# Patient Record
Sex: FEMALE | Race: OTHER | HISPANIC OR LATINO | URBAN - METROPOLITAN AREA
[De-identification: names, ages, dates, MRNs, and addresses within clinical notes are randomized per-mention and may not be internally consistent; named-entity substitution may affect disease eponyms.]

---

## 2018-06-18 ENCOUNTER — EMERGENCY (EMERGENCY)
Facility: HOSPITAL | Age: 63
LOS: 1 days | Discharge: HOME | End: 2018-06-18
Attending: EMERGENCY MEDICINE | Admitting: EMERGENCY MEDICINE
Payer: MEDICAID

## 2018-06-18 VITALS
OXYGEN SATURATION: 100 % | DIASTOLIC BLOOD PRESSURE: 107 MMHG | SYSTOLIC BLOOD PRESSURE: 183 MMHG | HEART RATE: 117 BPM | TEMPERATURE: 99 F | RESPIRATION RATE: 18 BRPM

## 2018-06-18 LAB
ALBUMIN SERPL ELPH-MCNC: 4.4 G/DL — SIGNIFICANT CHANGE UP (ref 3.3–5)
ALP SERPL-CCNC: 109 U/L — SIGNIFICANT CHANGE UP (ref 40–120)
ALT FLD-CCNC: 29 U/L — SIGNIFICANT CHANGE UP (ref 4–33)
AST SERPL-CCNC: 23 U/L — SIGNIFICANT CHANGE UP (ref 4–32)
BASE EXCESS BLDV CALC-SCNC: 3 MMOL/L — SIGNIFICANT CHANGE UP
BASOPHILS # BLD AUTO: 0.05 K/UL — SIGNIFICANT CHANGE UP (ref 0–0.2)
BASOPHILS NFR BLD AUTO: 1 % — SIGNIFICANT CHANGE UP (ref 0–2)
BILIRUB SERPL-MCNC: 0.4 MG/DL — SIGNIFICANT CHANGE UP (ref 0.2–1.2)
BLOOD GAS VENOUS - CREATININE: 0.78 MG/DL — SIGNIFICANT CHANGE UP (ref 0.5–1.3)
BUN SERPL-MCNC: 9 MG/DL — SIGNIFICANT CHANGE UP (ref 7–23)
CALCIUM SERPL-MCNC: 9.5 MG/DL — SIGNIFICANT CHANGE UP (ref 8.4–10.5)
CHLORIDE BLDV-SCNC: 109 MMOL/L — HIGH (ref 96–108)
CHLORIDE SERPL-SCNC: 102 MMOL/L — SIGNIFICANT CHANGE UP (ref 98–107)
CO2 SERPL-SCNC: 25 MMOL/L — SIGNIFICANT CHANGE UP (ref 22–31)
CREAT SERPL-MCNC: 0.83 MG/DL — SIGNIFICANT CHANGE UP (ref 0.5–1.3)
EOSINOPHIL # BLD AUTO: 0.05 K/UL — SIGNIFICANT CHANGE UP (ref 0–0.5)
EOSINOPHIL NFR BLD AUTO: 1 % — SIGNIFICANT CHANGE UP (ref 0–6)
GAS PNL BLDV: 138 MMOL/L — SIGNIFICANT CHANGE UP (ref 136–146)
GLUCOSE BLDV-MCNC: 95 — SIGNIFICANT CHANGE UP (ref 70–99)
GLUCOSE SERPL-MCNC: 94 MG/DL — SIGNIFICANT CHANGE UP (ref 70–99)
HCO3 BLDV-SCNC: 25 MMOL/L — SIGNIFICANT CHANGE UP (ref 20–27)
HCT VFR BLD CALC: 39 % — SIGNIFICANT CHANGE UP (ref 34.5–45)
HCT VFR BLDV CALC: 43.8 % — SIGNIFICANT CHANGE UP (ref 34.5–45)
HGB BLD-MCNC: 13.6 G/DL — SIGNIFICANT CHANGE UP (ref 11.5–15.5)
HGB BLDV-MCNC: 14.3 G/DL — SIGNIFICANT CHANGE UP (ref 11.5–15.5)
IMM GRANULOCYTES # BLD AUTO: 0 # — SIGNIFICANT CHANGE UP
IMM GRANULOCYTES NFR BLD AUTO: 0 % — SIGNIFICANT CHANGE UP (ref 0–1.5)
LACTATE BLDV-MCNC: 1.3 MMOL/L — SIGNIFICANT CHANGE UP (ref 0.5–2)
LYMPHOCYTES # BLD AUTO: 2.07 K/UL — SIGNIFICANT CHANGE UP (ref 1–3.3)
LYMPHOCYTES # BLD AUTO: 41 % — SIGNIFICANT CHANGE UP (ref 13–44)
MCHC RBC-ENTMCNC: 29.1 PG — SIGNIFICANT CHANGE UP (ref 27–34)
MCHC RBC-ENTMCNC: 34.9 % — SIGNIFICANT CHANGE UP (ref 32–36)
MCV RBC AUTO: 83.5 FL — SIGNIFICANT CHANGE UP (ref 80–100)
MONOCYTES # BLD AUTO: 0.46 K/UL — SIGNIFICANT CHANGE UP (ref 0–0.9)
MONOCYTES NFR BLD AUTO: 9.1 % — SIGNIFICANT CHANGE UP (ref 2–14)
NEUTROPHILS # BLD AUTO: 2.42 K/UL — SIGNIFICANT CHANGE UP (ref 1.8–7.4)
NEUTROPHILS NFR BLD AUTO: 47.9 % — SIGNIFICANT CHANGE UP (ref 43–77)
NRBC # FLD: 0 — SIGNIFICANT CHANGE UP
PCO2 BLDV: 50 MMHG — SIGNIFICANT CHANGE UP (ref 41–51)
PH BLDV: 7.37 PH — SIGNIFICANT CHANGE UP (ref 7.32–7.43)
PLATELET # BLD AUTO: 224 K/UL — SIGNIFICANT CHANGE UP (ref 150–400)
PMV BLD: 10.7 FL — SIGNIFICANT CHANGE UP (ref 7–13)
PO2 BLDV: 28 MMHG — LOW (ref 35–40)
POTASSIUM BLDV-SCNC: 3.7 MMOL/L — SIGNIFICANT CHANGE UP (ref 3.4–4.5)
POTASSIUM SERPL-MCNC: 3.9 MMOL/L — SIGNIFICANT CHANGE UP (ref 3.5–5.3)
POTASSIUM SERPL-SCNC: 3.9 MMOL/L — SIGNIFICANT CHANGE UP (ref 3.5–5.3)
PROT SERPL-MCNC: 8.6 G/DL — HIGH (ref 6–8.3)
RBC # BLD: 4.67 M/UL — SIGNIFICANT CHANGE UP (ref 3.8–5.2)
RBC # FLD: 11.8 % — SIGNIFICANT CHANGE UP (ref 10.3–14.5)
SAO2 % BLDV: 44.1 % — LOW (ref 60–85)
SODIUM SERPL-SCNC: 141 MMOL/L — SIGNIFICANT CHANGE UP (ref 135–145)
TROPONIN T, HIGH SENSITIVITY RESULT: < 6 NG/L — SIGNIFICANT CHANGE UP (ref ?–14)
TROPONIN T, HIGH SENSITIVITY RESULT: < 6 NG/L — SIGNIFICANT CHANGE UP (ref ?–14)
WBC # BLD: 5.05 K/UL — SIGNIFICANT CHANGE UP (ref 3.8–10.5)
WBC # FLD AUTO: 5.05 K/UL — SIGNIFICANT CHANGE UP (ref 3.8–10.5)

## 2018-06-18 PROCEDURE — 99218: CPT

## 2018-06-18 PROCEDURE — 71046 X-RAY EXAM CHEST 2 VIEWS: CPT | Mod: 26

## 2018-06-18 RX ORDER — FAMOTIDINE 10 MG/ML
20 INJECTION INTRAVENOUS AT BEDTIME
Qty: 0 | Refills: 0 | Status: DISCONTINUED | OUTPATIENT
Start: 2018-06-18 | End: 2018-06-22

## 2018-06-18 RX ORDER — METOPROLOL TARTRATE 50 MG
50 TABLET ORAL
Qty: 0 | Refills: 0 | Status: DISCONTINUED | OUTPATIENT
Start: 2018-06-18 | End: 2018-06-22

## 2018-06-18 RX ORDER — PANTOPRAZOLE SODIUM 20 MG/1
40 TABLET, DELAYED RELEASE ORAL DAILY
Qty: 0 | Refills: 0 | Status: DISCONTINUED | OUTPATIENT
Start: 2018-06-18 | End: 2018-06-22

## 2018-06-18 RX ORDER — KETOROLAC TROMETHAMINE 30 MG/ML
15 SYRINGE (ML) INJECTION ONCE
Qty: 0 | Refills: 0 | Status: DISCONTINUED | OUTPATIENT
Start: 2018-06-18 | End: 2018-06-18

## 2018-06-18 RX ORDER — DIPHENHYDRAMINE HCL 50 MG
25 CAPSULE ORAL AT BEDTIME
Qty: 0 | Refills: 0 | Status: DISCONTINUED | OUTPATIENT
Start: 2018-06-18 | End: 2018-06-22

## 2018-06-18 RX ADMIN — Medication 50 MILLIGRAM(S): at 21:14

## 2018-06-18 RX ADMIN — Medication 300 MILLIGRAM(S): at 21:16

## 2018-06-18 RX ADMIN — FAMOTIDINE 20 MILLIGRAM(S): 10 INJECTION INTRAVENOUS at 21:14

## 2018-06-18 RX ADMIN — Medication 25 MILLIGRAM(S): at 21:14

## 2018-06-18 RX ADMIN — Medication 15 MILLIGRAM(S): at 21:00

## 2018-06-18 RX ADMIN — Medication 15 MILLIGRAM(S): at 20:34

## 2018-06-18 RX ADMIN — Medication 0.2 MILLIGRAM(S): at 21:14

## 2018-06-18 NOTE — ED ADULT TRIAGE NOTE - CHIEF COMPLAINT QUOTE
pt presents c/o infected tooth x months now c/o neck swelling and pain radiating to left chest with fever since last week worsening. pt reports she is unable to find an oral surgeon to pull tooth and she came to ED due to her history of endocarditis. pt reports taking bp meds today and states her bp keeps increasing PMHX: endocarditis, htn, sjogren' s disease, osteoporosis

## 2018-06-18 NOTE — ED PROVIDER NOTE - NS ED ROS FT
GENERAL: No fever or chills, EYES: no change in vision, HEENT: no trouble swallowing or speaking, CARDIAC: no chest pain, PULMONARY: no cough or SOB, GI: no abdominal pain, no nausea, no vomiting, no diarrhea or constipation, : No changes in urination, SKIN: no rashes, NEURO: no headache,  MSK: No joint pain ~Kaiden Lora M.D. Resident

## 2018-06-18 NOTE — ED PROVIDER NOTE - MEDICAL DECISION MAKING DETAILS
61 yo F PMHx Sjogren's, endocarditis, HTN, CAD with stent x 1 p/w toothache and L-sided CP, DDx: dental abscess, CAD will obtain basic labs + troponin, blood cultures, EKG, dental consult, reevaluate

## 2018-06-18 NOTE — ED PROVIDER NOTE - OBJECTIVE STATEMENT
61 yo F PMHx Sjogren's, endocarditis, HTN, CAD with stent x 1 p/w toothache and L-sided CP. Pt has had an infected tooth for months and has been trying to find an oral surgeon for tooth extraction but given her complicated history of endocarditis she has not been able to find a willing provider. She has had pain around the tooth for months but in the past 2 days she has also had L sided chest pain and L arm pain. She has been taking tylenol with minimal relief. She denies abd pain, N/V, has had low grade fevers.

## 2018-06-18 NOTE — PROGRESS NOTE ADULT - SUBJECTIVE AND OBJECTIVE BOX
Patient is a 62y old female who presents with a chief complaint of lower left tooth pain    HPI: pt reports that crown on lower left first premolar dislodged many years ago, pt did not obtain treatment. Tooth began having mild pain a few months ago, however last week patient began experiencing severe pain LL (10/10) radiating to head and down her neck. Pt reports that pain interferes with sleep, pt cannot eat on left side. Pt went to outside dentist who declined to extract tooth due to pt's medical hx of Sjogren's and HTN. Pt already was prescribed one-week course of Z-Sylvester antibiotics by outside dentist. Pt would like tooth extracted as soon as possible.     PAST MEDICAL & SURGICAL HISTORY:   hx of endocarditis   cardiac stent  Sjogren's Syndrome   HTN                        MEDICATIONS  (STANDING):    MEDICATIONS  (PRN):      Allergies:   Avelox (Unknown)  Cipro (Unknown)  vanco, pcn, rocephin pt. not sure? (Unknown)    *SOCIAL HISTORY: pt presented with     *Last Dental Visit: two weeks ago    Vital Signs Last 24 Hrs  T(C): 37.1 (18 Jun 2018 16:48), Max: 37.2 (18 Jun 2018 15:07)  T(F): 98.8 (18 Jun 2018 16:48), Max: 99 (18 Jun 2018 15:07)  HR: 94 (18 Jun 2018 16:48) (92 - 117)  BP: 156/80 (18 Jun 2018 16:48) (156/80 - 183/107)  BP(mean): --  RR: 17 (18 Jun 2018 16:48) (16 - 18)  SpO2: 98% (18 Jun 2018 16:48) (98% - 100%)    EOE:  TMJ   ( -  ) clicks                    ( -   ) pops                    (  -  ) crepitus             ( -  ) trismus             ( -  ) LAD             ( -  ) swelling             ( -  ) asymmetry             ( -  ) palpation              IOE:  permanent dentition: partially intact with multiple missing teeth           hard/soft palate:  ( -  ) palatal torus           tongue/FOM: WNL           labial/buccal mucosa: WNL           no clinical signs of acute infection  #21: carious retained root, missing crown restoration           ( +  ) percussion           ( +  ) palpation           ( -  ) swelling           ( + ) caries           ( - ) mobility     *DENTAL RADIOGRAPHS: periapical taken #21 previous endodontic therapy, recurrent caries. Periapical radiolucency noted.    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     PROCEDURE:  EOE, IOE, rads. Explained to pt that there are no clinical signs of acute infection, and that tooth should be extracted in outpatient setting. Pt understands and accepts. Gave pt contact information for [facility] to schedule emergency appointment.     RECOMMENDATIONS:  1) Pain control medication as per ED physician instructions.   2) Dental F/U with outpatient dentist for extraction #21 and comprehensive dental care.   3) If any difficulty swallowing/breathing, facial swelling, or fever occur, return to ED.     Sundeep Espino DDS #[id number]

## 2018-06-18 NOTE — ED CDU PROVIDER INITIAL DAY NOTE - ATTENDING CONTRIBUTION TO CARE
I performed a face-to-face evaluation of the patient and performed a history and physical examination. I agree with the history and physical examination.    Jacob: Tooth 21 degenerated, 6 months of pain, affecting L jaw and neck. L shoulder pain and L CP not related to exertion. Recent infection of tooth 21 improved w/ Z-pack (less fever/pain/swelling). Unable to get extraction as outpatient b/c h/o cath-related endocarditis and her HTN. Has seen multiple dentists and failed to get extracted. Has known 2/6 DANILO at [facility] (old). In ED, serum WBC unremarkable; no F. Will admit to CDU for serial trop and EKG. In AM, call Dental Clinic [phone] for same-day appointment. Control BP. Take Clindamycin 300 mg 1 hour before appointment. If fails to get appointment or extraction, admit patient or have patient return to [facility] June 19 to see Star James in [place] starting at 4 PM.

## 2018-06-18 NOTE — ED ADULT NURSE NOTE - OBJECTIVE STATEMENT
Pt received in #6, aaox3 with c/o ongoing left lower dental pain. States that she has an infected tooth which requires extraction. However, the pt has been unable to make an appointment with an oral surgeon. Pt states that her tooth pain is chronic. Denies any acute symptoms or deviations from baseline.

## 2018-06-18 NOTE — ED PROVIDER NOTE - PHYSICAL EXAMINATION
Gen: AAOx3, non-toxic  Head: NCAT  HEENT: EOMI, oral mucosa moist, normal conjunctiva, bleeding and rotten appearing tooth #21, pain to palpation of surrounding gums, no neck swelling, no trismus  Lung: CTAB, no respiratory distress, no wheezes/rhonchi/rales B/L, speaking in full sentences  CV: RRR, no murmurs, rubs or gallops  Abd: soft, NTND, no guarding  MSK: no visible deformities  Neuro: No focal sensory or motor deficits  Skin: Warm, well perfused, no rash  Psych: normal affect.   ~Kaiden Lora M.D. Resident

## 2018-06-18 NOTE — ED PROVIDER NOTE - ATTENDING CONTRIBUTION TO CARE
62F pmh sjogren's syndrome, endocarditis (s/p cath 2 yrs ago), cad s/p stent x 1, htn, presents with dental pain x mo, now with L-sided neck and chest pain over the past couple of days. pain sharp, constant, no exacerbating or alleviating sx. pain does radiate to L arm. does not radiate to back. denies sob. denies chills, n/v/d, abd pain. states she has had "low grade fever at home." has been taking extra strength tylenol with only min alleviation of pain. denies headache, dizziness, change in vision, numbness, weakness.     PE: NAD, NCAT, MMM, Trachea midline, Normal conjunctiva, lungs CTAB, S1/S2 RRR, Normal perfusion, 2+ radial pulses bilat, Abdomen Soft, NTND, No rebound/guarding, No LE edema, No deformity of extremities, No rashes,  No focal motor or sensory deficits.     62F pmh cad s/p stent with subsequent endocarditis, sjogren's syndrome, htn, presents with dental/jaw pain, along with L-sided chest pain radiating to neck and L arm. pain does not radiate to back, pulses equal, low suspicion of dissection. denies sob, no hypoxia, no calf swelling or pain, very low suspicion of PE. concern for potential acs, to obtain troponin. ekg without ischemic changes. pt is afebrile in ed per rectal temp, low suspicion of endocarditis but given hx and dental complaint will check blood culture x 3. Check CBC eval for anemia, cmp eval for metabolic derangement. cxr eval for effusion/consolidation. if no sig abnormalities and trop not abnormal, plan for cdu for stress as heart score 4. - Kilo Gonsalves MD 62F pmh sjogren's syndrome, endocarditis (s/p cath 2 yrs ago), cad s/p stent x 1, htn, presents with dental pain x mo, now with L-sided neck and chest pain over the past couple of days. pain sharp, constant, no exacerbating or alleviating sx. pain does radiate to L arm. does not radiate to back. denies sob. denies chills, n/v/d, abd pain. states she has had "low grade fever at home." has been taking extra strength tylenol with only min alleviation of pain. denies headache, dizziness, change in vision, numbness, weakness.     PE: NAD, NCAT, MMM, Trachea midline, Normal conjunctiva, lungs CTAB, S1/S2 RRR, Normal perfusion, 2+ radial pulses bilat, Abdomen Soft, NTND, No rebound/guarding, No LE edema, No deformity of extremities, No rashes,  No focal motor or sensory deficits.     62F pmh cad s/p stent with subsequent endocarditis, sjogren's syndrome, htn, presents with dental/jaw pain, along with L-sided chest pain radiating to neck and L arm. pain does not radiate to back, pulses equal, low suspicion of dissection. denies sob, no hypoxia, no calf swelling or pain, very low suspicion of PE. concern for potential acs, to obtain troponin. ekg without ischemic changes. pt is afebrile in ed per rectal temp, low suspicion of endocarditis but given hx and dental complaint will check blood culture x 3. Check CBC eval for anemia, cmp eval for metabolic derangement. cxr eval for effusion/consolidation. if no sig abnormalities and trop not abnormal, plan for cdu for stress vs delta trop as heart score 4. - Kilo Gonsalves MD

## 2018-06-18 NOTE — ED CDU PROVIDER INITIAL DAY NOTE - MEDICAL DECISION MAKING DETAILS
Jacob: Tooth 21 degenerated, 6 months of pain, affecting L jaw and neck. L shoulder pain and L CP not related to exertion. Recent infection of tooth 21 improved w/ Z-pack (less fever/pain/swelling). Unable to get extraction as outpatient b/c h/o cath-related endocarditis and her HTN. Has seen multiple dentists and failed to get extracted. Has known 2/6 DANILO at [email or web address] (old). In ED, serum WBC unremarkable; no F. Will admit to CDU for serial trop and EKG. In AM, call Dental Clinic [phone] for same-day appointment. Control BP. Take Clindamycin 300 mg 1 hour before appointment. If fails to get appointment or extraction, admit patient or have patient return to [facility] June 19 to see Star James in [place] starting at 4 PM.

## 2018-06-18 NOTE — ED CDU PROVIDER INITIAL DAY NOTE - OBJECTIVE STATEMENT
Jaocb: Tooth 21 degenerated, 6 months of pain, affecting L jaw and neck. L shoulder pain and L CP not related to exertion. Recent infection of tooth 21 improved w/ Z-pack (less fever/pain/swelling). Unable to get extraction as outpatient b/c h/o cath-related endocarditis and her HTN. Has seen multiple dentists and failed to get extracted. Has known 2/6 DANILO at [facility] (old). In ED, serum WBC unremarkable; no F. Will admit to CDU for serial trop and EKG. In AM, call Dental Clinic [phone] for same-day appointment. Control BP. Take Clindamycin 300 mg 1 hour before appointment. If fails to get appointment or extraction, admit patient or have patient return to [facility] June 19 to see Star James in [place] starting at 4 PM.

## 2018-06-19 VITALS
OXYGEN SATURATION: 98 % | DIASTOLIC BLOOD PRESSURE: 85 MMHG | TEMPERATURE: 98 F | SYSTOLIC BLOOD PRESSURE: 130 MMHG | HEART RATE: 72 BPM | RESPIRATION RATE: 16 BRPM

## 2018-06-19 LAB
SPECIMEN SOURCE: SIGNIFICANT CHANGE UP

## 2018-06-19 PROCEDURE — 99217: CPT

## 2018-06-19 RX ORDER — KETOROLAC TROMETHAMINE 30 MG/ML
15 SYRINGE (ML) INJECTION ONCE
Qty: 0 | Refills: 0 | Status: DISCONTINUED | OUTPATIENT
Start: 2018-06-19 | End: 2018-06-19

## 2018-06-19 RX ADMIN — Medication 50 MILLIGRAM(S): at 07:07

## 2018-06-19 RX ADMIN — Medication 15 MILLIGRAM(S): at 01:26

## 2018-06-19 RX ADMIN — Medication 15 MILLIGRAM(S): at 01:41

## 2018-06-19 RX ADMIN — Medication 300 MILLIGRAM(S): at 07:07

## 2018-06-19 RX ADMIN — Medication 0.2 MILLIGRAM(S): at 07:07

## 2018-06-19 NOTE — ED CDU PROVIDER DISPOSITION NOTE - PLAN OF CARE
You were seen for toothpain. You were given Clindamycin for a dental infection. Your blood pressure was normal while in the hospital. Follow up with the dental clinic today, if they cannot remove your tooth you should return to the ER for admission. Please follow up with your Primary MD in 24-48 hr.  Seek immediate medical care for any new/worsening signs or symptoms.

## 2018-06-19 NOTE — ED CDU PROVIDER SUBSEQUENT DAY NOTE - HISTORY
61 yo F here for tooth pain, sent from dentist for HTN and vaugue CP. CP now resolved and pt reports + tooth pain. In CDU for monitoring, abx, and BP monitoring. Will dc in AM for dental clinic for extraction.

## 2018-06-19 NOTE — ED CDU PROVIDER SUBSEQUENT DAY NOTE - PROGRESS NOTE DETAILS
pt doing well, pain improved with toradol. will continue to monitor overnight. home BP meds ordered. will continue to monitor and d/c to dental clinic in AM. Patient laying comfortably in bed NAD, No complaints. No toothache. /85. Pt can be discharge and f/u in dental clinic this morning for tooth extraction. The patient was given verbal and written discharge instructions. Specifically, instructions when to return to the ED and when to seek follow-up from their pcp was discussed. Any specialty follow-up was discussed, including how to make an appointment.  Instructions were discussed in simple, plain language and was understood by the patient. The patient understands that should their symptoms worsen or any new symptoms arise, they should return to the ED immediately for further evaluation. All pt's questions were answered. Patient verbalizes understanding.

## 2018-06-19 NOTE — ED CDU PROVIDER DISPOSITION NOTE - CLINICAL COURSE
Nahun PARADA- 61 Y/O F with h/o endocarditis, htn p/w tooth pain and was sent to ed for elevated bp and plan to monitor chace overnight and if it remains controlled , then discharge to f/u with dental clinic in the morning for dental extraction    bp remained very stable and controlled, discharge to dental clinic for extraction today. stable for discharge

## 2018-06-19 NOTE — ED CDU PROVIDER SUBSEQUENT DAY NOTE - ENMT NEGATIVE STATEMENT, MLM
Ears: no ear pain and no hearing problems.Nose: no nasal congestion and no nasal drainage.Mouth/Throat: +dental pain, no dysphagia, no hoarseness and no throat pain.Neck: no lumps, no pain, no stiffness and no swollen glands

## 2018-06-19 NOTE — ED CDU PROVIDER SUBSEQUENT DAY NOTE - MEDICAL DECISION MAKING DETAILS
61 yo F here for toothache/dental pain. plan for obs in CDU overnight, monitor BP, and in AM dc to dental clinic for same day appt. will give clinda in CDU. if unable to extract in clinic tomorrow pt is to return to ED for admission for extraction.

## 2018-06-23 LAB
BACTERIA BLD CULT: SIGNIFICANT CHANGE UP

## 2018-11-09 NOTE — ED PROVIDER NOTE - NS_HEARTSCTROPONIN_ED_A_ED
OVERDUE RESULTS REMINDER  Per patient's chart, mammogram is scheduled for 12/20/18    MAMMO SCREENING BILATERAL W CAD Less than or equal to Normal Limit

## 2019-11-26 ENCOUNTER — EMERGENCY (EMERGENCY)
Facility: HOSPITAL | Age: 64
LOS: 1 days | Discharge: HOME | End: 2019-11-26
Attending: STUDENT IN AN ORGANIZED HEALTH CARE EDUCATION/TRAINING PROGRAM | Admitting: STUDENT IN AN ORGANIZED HEALTH CARE EDUCATION/TRAINING PROGRAM
Payer: MEDICAID

## 2019-11-26 VITALS
DIASTOLIC BLOOD PRESSURE: 97 MMHG | TEMPERATURE: 98 F | OXYGEN SATURATION: 100 % | RESPIRATION RATE: 16 BRPM | HEART RATE: 82 BPM | SYSTOLIC BLOOD PRESSURE: 161 MMHG

## 2019-11-26 PROBLEM — I38 ENDOCARDITIS, VALVE UNSPECIFIED: Chronic | Status: ACTIVE | Noted: 2018-06-18

## 2019-11-26 PROBLEM — M35.00 SJOGREN SYNDROME, UNSPECIFIED: Chronic | Status: ACTIVE | Noted: 2018-06-18

## 2019-11-26 PROCEDURE — 99283 EMERGENCY DEPT VISIT LOW MDM: CPT | Mod: 25

## 2019-11-26 PROCEDURE — 16020 DRESS/DEBRID P-THICK BURN S: CPT | Mod: RT

## 2019-11-26 RX ORDER — TETANUS TOXOID, REDUCED DIPHTHERIA TOXOID AND ACELLULAR PERTUSSIS VACCINE, ADSORBED 5; 2.5; 8; 8; 2.5 [IU]/.5ML; [IU]/.5ML; UG/.5ML; UG/.5ML; UG/.5ML
0.5 SUSPENSION INTRAMUSCULAR ONCE
Refills: 0 | Status: COMPLETED | OUTPATIENT
Start: 2019-11-26 | End: 2019-11-26

## 2019-11-26 RX ORDER — ACETAMINOPHEN 500 MG
975 TABLET ORAL ONCE
Refills: 0 | Status: COMPLETED | OUTPATIENT
Start: 2019-11-26 | End: 2019-11-26

## 2019-11-26 RX ORDER — IBUPROFEN 200 MG
600 TABLET ORAL ONCE
Refills: 0 | Status: COMPLETED | OUTPATIENT
Start: 2019-11-26 | End: 2019-11-26

## 2019-11-26 RX ADMIN — Medication 975 MILLIGRAM(S): at 12:48

## 2019-11-26 RX ADMIN — Medication 600 MILLIGRAM(S): at 12:48

## 2019-11-26 RX ADMIN — TETANUS TOXOID, REDUCED DIPHTHERIA TOXOID AND ACELLULAR PERTUSSIS VACCINE, ADSORBED 0.5 MILLILITER(S): 5; 2.5; 8; 8; 2.5 SUSPENSION INTRAMUSCULAR at 12:51

## 2019-11-26 NOTE — ED PROVIDER NOTE - NSFOLLOWUPINSTRUCTIONS_ED_ALL_ED_FT
Burn    A burn is an injury to your skin or the tissues under your skin usually caused by heat or caustic chemicals. In severe cases, a burn can damage the muscles and bones under the skin. There are three different degrees of burns: first (mild), second, and third (severe). Make sure to use any prescribed ointments as directed. If you were prescribed antibiotic medicine, take it as told by your health care provider. Do not stop using the antibiotic even if your condition improves. Follow up is available at the burn clinic.    SEEK IMMEDIATE MEDICAL CARE IF YOU HAVE ANY OF THE FOLLOWING SYMPTOMS: red streaks near the burn, severe pain, or fever. Burn    A burn is an injury to your skin or the tissues under your skin usually caused by heat or caustic chemicals. In severe cases, a burn can damage the muscles and bones under the skin. There are three different degrees of burns: first (mild), second, and third (severe). Make sure to use any prescribed ointments as directed. If you were prescribed antibiotic medicine, take it as told by your health care provider. Do not stop using the antibiotic even if your condition improves. Follow up is available at the burn clinic.    SEEK IMMEDIATE MEDICAL CARE IF YOU HAVE ANY OF THE FOLLOWING SYMPTOMS: red streaks near the burn, severe pain, or fever.    Please call the Burn Clinic at [phone] or [phone] for an appointment  [facility] [address] [place] [address]
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
## 2019-11-26 NOTE — ED PROVIDER NOTE - PATIENT PORTAL LINK FT
You can access the FollowMyHealth Patient Portal offered by [facility] by registering at the following website: [email or web address]. By joining [facility]’s FollowMyHealth portal, you will also be able to view your health information using other applications (apps) compatible with our system.

## 2019-11-26 NOTE — ED ADULT NURSE NOTE - CAS EDN DISCHARGE ASSESSMENT
Patient baseline mental status/Awake/No adverse reaction to first time med in ED/Alert and oriented to person, place and time 19-Jun-2019 19:00
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
## 2019-11-30 ENCOUNTER — EMERGENCY (EMERGENCY)
Facility: HOSPITAL | Age: 64
LOS: 1 days | Discharge: HOME | End: 2019-11-30
Attending: EMERGENCY MEDICINE | Admitting: EMERGENCY MEDICINE
Payer: MEDICAID

## 2019-11-30 VITALS
DIASTOLIC BLOOD PRESSURE: 90 MMHG | RESPIRATION RATE: 18 BRPM | SYSTOLIC BLOOD PRESSURE: 185 MMHG | OXYGEN SATURATION: 99 % | HEART RATE: 88 BPM

## 2019-11-30 VITALS
DIASTOLIC BLOOD PRESSURE: 93 MMHG | RESPIRATION RATE: 18 BRPM | SYSTOLIC BLOOD PRESSURE: 176 MMHG | TEMPERATURE: 99 F | HEART RATE: 91 BPM | OXYGEN SATURATION: 99 %

## 2019-11-30 LAB
ANION GAP SERPL CALC-SCNC: 10 MMO/L — SIGNIFICANT CHANGE UP (ref 7–14)
BUN SERPL-MCNC: 9 MG/DL — SIGNIFICANT CHANGE UP (ref 7–23)
CALCIUM SERPL-MCNC: 9.5 MG/DL — SIGNIFICANT CHANGE UP (ref 8.4–10.5)
CHLORIDE SERPL-SCNC: 104 MMOL/L — SIGNIFICANT CHANGE UP (ref 98–107)
CO2 SERPL-SCNC: 25 MMOL/L — SIGNIFICANT CHANGE UP (ref 22–31)
CREAT SERPL-MCNC: 0.83 MG/DL — SIGNIFICANT CHANGE UP (ref 0.5–1.3)
GLUCOSE SERPL-MCNC: 83 MG/DL — SIGNIFICANT CHANGE UP (ref 70–99)
HCT VFR BLD CALC: 41.8 % — SIGNIFICANT CHANGE UP (ref 34.5–45)
HGB BLD-MCNC: 13.4 G/DL — SIGNIFICANT CHANGE UP (ref 11.5–15.5)
MCHC RBC-ENTMCNC: 29.4 PG — SIGNIFICANT CHANGE UP (ref 27–34)
MCHC RBC-ENTMCNC: 32.1 % — SIGNIFICANT CHANGE UP (ref 32–36)
MCV RBC AUTO: 91.7 FL — SIGNIFICANT CHANGE UP (ref 80–100)
NRBC # FLD: 0 K/UL — SIGNIFICANT CHANGE UP (ref 0–0)
PLATELET # BLD AUTO: 222 K/UL — SIGNIFICANT CHANGE UP (ref 150–400)
PMV BLD: 11.2 FL — SIGNIFICANT CHANGE UP (ref 7–13)
POTASSIUM SERPL-MCNC: 5.2 MMOL/L — SIGNIFICANT CHANGE UP (ref 3.5–5.3)
POTASSIUM SERPL-SCNC: 5.2 MMOL/L — SIGNIFICANT CHANGE UP (ref 3.5–5.3)
RBC # BLD: 4.56 M/UL — SIGNIFICANT CHANGE UP (ref 3.8–5.2)
RBC # FLD: 12.1 % — SIGNIFICANT CHANGE UP (ref 10.3–14.5)
SODIUM SERPL-SCNC: 139 MMOL/L — SIGNIFICANT CHANGE UP (ref 135–145)
WBC # BLD: 6.17 K/UL — SIGNIFICANT CHANGE UP (ref 3.8–10.5)
WBC # FLD AUTO: 6.17 K/UL — SIGNIFICANT CHANGE UP (ref 3.8–10.5)

## 2019-11-30 PROCEDURE — 99283 EMERGENCY DEPT VISIT LOW MDM: CPT

## 2019-11-30 RX ORDER — IBUPROFEN 200 MG
600 TABLET ORAL ONCE
Refills: 0 | Status: COMPLETED | OUTPATIENT
Start: 2019-11-30 | End: 2019-11-30

## 2019-11-30 RX ORDER — IBUPROFEN 200 MG
2 TABLET ORAL
Qty: 56 | Refills: 0
Start: 2019-11-30 | End: 2019-12-06

## 2019-11-30 RX ORDER — ACETAMINOPHEN 500 MG
2 TABLET ORAL
Qty: 42 | Refills: 0
Start: 2019-11-30 | End: 2019-12-06

## 2019-11-30 RX ORDER — ACETAMINOPHEN 500 MG
650 TABLET ORAL ONCE
Refills: 0 | Status: COMPLETED | OUTPATIENT
Start: 2019-11-30 | End: 2019-11-30

## 2019-11-30 RX ADMIN — Medication 650 MILLIGRAM(S): at 15:20

## 2019-11-30 RX ADMIN — Medication 600 MILLIGRAM(S): at 15:20
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
## 2019-11-30 NOTE — ED PROVIDER NOTE - CHIEF COMPLAINT
The patient is a 64y Female complaining of The patient is a 64y Female complaining of wound pain and "low grade fever"

## 2019-11-30 NOTE — ED PROVIDER NOTE - NSFOLLOWUPINSTRUCTIONS_ED_ALL_ED_FT
Burn    A burn is an injury to your skin or the tissues under your skin usually caused by heat or caustic chemicals. In severe cases, a burn can damage the muscles and bones under the skin. There are three different degrees of burns: first (mild), second, and third (severe). Make sure to use any prescribed ointments as directed. If you were prescribed antibiotic medicine, take it as told by your health care provider. Do not stop using the antibiotic even if your condition improves. Follow up is available at the burn clinic.    SEEK IMMEDIATE MEDICAL CARE IF YOU HAVE ANY OF THE FOLLOWING SYMPTOMS: red streaks near the burn, severe pain, or fever.    - Lab and imaging results, if performed, were discussed with you along with your discharge diagnosis    - Follow up with your doctor in 1 week - bring copies of your results if you were given. If you do not have a primary doctor, please call [phone] to find one convenient for you    - Return to the ED for any new, worsening, or concerning symptoms to you    - Continue all prescribed medications    - Take ibuprofen/tylenol as directed as needed for pain  To control your pain at home, you should take Ibuprofen 400 mg along with Tylenol 650mg-1000mg every 6 to 8 hours. Limit your maximum daily Tylenol from all sources to 4000mg. Be aware that many other medications contain acetaminophen which is also known as Tylenol. Taking Tylenol and Ibuprofen together has been shown to be more effective at relieving pain than taking them separately. These are both over the counter medications that you can  at your local pharmacy without a prescription. You need to respect all of the warnings on the bottles. You shouldn’t take these medications for more than a week without following up with your doctor. Both medications come with certain risks and side effects that you need to discuss with your doctor, especially if you are taking them for a prolonged period.    - Rest and keep yourself hydrated with fluids
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
## 2019-11-30 NOTE — ED PROVIDER NOTE - NS ED ROS FT
CONST: + fevers, no chills  EYES: no pain, no vision changes  ENT: no sore throat, no ear pain, no change in hearing  CV: no chest pain, no leg swelling  RESP: no shortness of breath, no cough  ABD: no abdominal pain, no nausea, no vomiting, no diarrhea  : no dysuria, no flank pain, no hematuria  MSK: no back pain, +R forearm pain  NEURO: no headache or additional neurologic complaints  HEME: no easy bleeding  SKIN:  +burn on R forearm
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
## 2020-11-01 ENCOUNTER — EMERGENCY (EMERGENCY)
Facility: HOSPITAL | Age: 65
LOS: 1 days | Discharge: HOME | End: 2020-11-01
Attending: EMERGENCY MEDICINE | Admitting: EMERGENCY MEDICINE
Payer: MEDICARE

## 2020-11-01 VITALS
DIASTOLIC BLOOD PRESSURE: 81 MMHG | OXYGEN SATURATION: 100 % | HEART RATE: 94 BPM | SYSTOLIC BLOOD PRESSURE: 185 MMHG | TEMPERATURE: 98 F | RESPIRATION RATE: 18 BRPM

## 2020-11-01 VITALS
SYSTOLIC BLOOD PRESSURE: 166 MMHG | DIASTOLIC BLOOD PRESSURE: 100 MMHG | RESPIRATION RATE: 18 BRPM | TEMPERATURE: 97 F | HEART RATE: 102 BPM | OXYGEN SATURATION: 100 %

## 2020-11-01 DIAGNOSIS — Z98.62 PERIPHERAL VASCULAR ANGIOPLASTY STATUS: Chronic | ICD-10-CM

## 2020-11-01 DIAGNOSIS — Z90.49 ACQUIRED ABSENCE OF OTHER SPECIFIED PARTS OF DIGESTIVE TRACT: Chronic | ICD-10-CM

## 2020-11-01 PROCEDURE — 99284 EMERGENCY DEPT VISIT MOD MDM: CPT

## 2020-11-01 RX ORDER — OXYCODONE AND ACETAMINOPHEN 5; 325 MG/1; MG/1
1 TABLET ORAL
Qty: 12 | Refills: 0
Start: 2020-11-01 | End: 2020-11-03

## 2020-11-01 RX ORDER — ONDANSETRON 8 MG/1
4 TABLET, FILM COATED ORAL ONCE
Refills: 0 | Status: COMPLETED | OUTPATIENT
Start: 2020-11-01 | End: 2020-11-01

## 2020-11-01 RX ORDER — KETOROLAC TROMETHAMINE 30 MG/ML
30 SYRINGE (ML) INJECTION ONCE
Refills: 0 | Status: DISCONTINUED | OUTPATIENT
Start: 2020-11-01 | End: 2020-11-01

## 2020-11-01 RX ORDER — OXYCODONE AND ACETAMINOPHEN 5; 325 MG/1; MG/1
1 TABLET ORAL ONCE
Refills: 0 | Status: DISCONTINUED | OUTPATIENT
Start: 2020-11-01 | End: 2020-11-01

## 2020-11-01 RX ADMIN — OXYCODONE AND ACETAMINOPHEN 1 TABLET(S): 5; 325 TABLET ORAL at 13:57

## 2020-11-01 RX ADMIN — ONDANSETRON 4 MILLIGRAM(S): 8 TABLET, FILM COATED ORAL at 14:46

## 2020-11-01 RX ADMIN — OXYCODONE AND ACETAMINOPHEN 1 TABLET(S): 5; 325 TABLET ORAL at 13:10

## 2020-11-01 RX ADMIN — Medication 30 MILLIGRAM(S): at 13:57

## 2020-11-01 RX ADMIN — Medication 30 MILLIGRAM(S): at 13:10

## 2020-11-01 NOTE — ED PROVIDER NOTE - PATIENT PORTAL LINK FT
You can access the FollowMyHealth Patient Portal offered by [facility] by registering at the following website: [email or web address]. By joining [facility]’s FollowMyHealth portal, you will also be able to view your health information using other applications (apps) compatible with our system.

## 2020-11-01 NOTE — ED PROVIDER NOTE - OBJECTIVE STATEMENT
66 y/o F with hx of Sjogrens, endocarditis (2001), HTN, angioplasty, ILD presents with 1x week of worsening R leg and arm pain. Pt has trigger finger to her R 4th finger and that now the shooting pain radiates past her forearm and up to her shoulder. She had R leg neuropathy to the plantar surface but notes new onset sharp pain from the dorsal aspect of her 2nd, 3rd, and 4th toes that has made it difficult for her to walk. Pt was seen by her Rheumatologist (Seyda Fraire @ [facility]) x1 month ago for the pain and prescribed Motrin and an increase in her Gabapentin with no relief. No other attempts at relief PTA. Denies injury/trauma, HA, visual complaints, dizziness, palpitations, CP, SOB, abd pain, n/v/d/c, back pain, urinary sx, generalized weakness, decreased sensation/motor or other associated sx. No recent illness, travel, abx use, sick contacts.

## 2020-11-01 NOTE — ED PROVIDER NOTE - CLINICAL SUMMARY MEDICAL DECISION MAKING FREE TEXT BOX
64 y/o female pmh Sjorgens w/ acute on chronic pain- no fever, no chest pain, no sob, no neuro deficits- will control pain and dc

## 2020-11-01 NOTE — ED PROVIDER NOTE - ATTENDING CONTRIBUTION TO CARE
Dr. Daniel: 64 yo female with Sjogren's syndrome, endocarditis in 2001, HTN, s/p angioplasty, interstitial lung disease, on pain control with rheumatology, in ED with continued and worsening pain in right UE and right LE, two locations that pt chronically has pain.  No falls/injuries.  No HA, CP/SOB, N/V/D or fever.  No recent illness.  On exam pt uncomfortable appearing due to chronic pain, heart RRR, lungs CTAB, abd NTND, extremities without swelling, strength 5/5 in all extremities but with reproduction of pain in right arm and leg with movement, and skin without rash.

## 2020-11-01 NOTE — ED PROVIDER NOTE - PMH
Endocarditis    Hypertension, unspecified type    Interstitial lung disease    Sjogren's syndrome

## 2020-11-01 NOTE — ED ADULT NURSE NOTE - OBJECTIVE STATEMENT
right upper extremity pain and right leg pain with indented muscular atrophy  to right thigh, curled right 4th digit x 1 month. Patient with appointment with rheumatologist and referral to surgeon for right 4th finger but has not gone for appointment, having trouble sleeping with psych appointment for April 2021. Patient AOx3, reports coming to ED for pain because ibuprofen upsets her stomach. Patient with skin intact, able to ambulate steady, denies fevers, chills, N/V/D.

## 2020-11-01 NOTE — ED ADULT TRIAGE NOTE - CHIEF COMPLAINT QUOTE
pt c/o shooting pain in right arm and right leg x 1 month. states she saw her rheumatologist and prescribed with motrin and gabapentin.

## 2024-09-23 NOTE — ED CDU PROVIDER SUBSEQUENT DAY NOTE - ATTENDING CONTRIBUTION TO CARE
Unable to leave voicemail message. Sent portal message and text. Scheduled for 12/13/24 at 11:20.   Nahun PARADA- 61 Y/O F with h/o endocarditis, htn p/w tooth pain and was sent to ed for elevated bp and plan to monitor chace overnight and if it remains controlled , then discharge to f/u with dental clinic in the morning for dental extraction    pt is alert, well appearing female, s1s2 normal reg, b/l clear breath sound, abd soft, nt, nd, normal bowel sounds, skin warm, dry, good turgor    bp remained very stable and controlled, will discharge to dental clinic for extraction today

## 2025-04-02 ENCOUNTER — APPOINTMENT (OUTPATIENT)
Dept: ORTHOPEDIC SURGERY | Facility: HOSPITAL | Age: 70
End: 2025-04-02

## 2025-04-02 PROBLEM — Z00.00 ENCOUNTER FOR PREVENTIVE HEALTH EXAMINATION: Status: ACTIVE | Noted: 2025-04-02

## 2025-04-21 NOTE — ED CDU PROVIDER SUBSEQUENT DAY NOTE - PMH
[facility] [place] [address]      PATIENT NAME:  Yessenia Reis [facility]. REC. #:  [id number]   DATE:  04/21/25 YOB: 1950     PHYSICIAN:  Easton Lim DO.    PROCEDURE:   Bilateral sacroiliac joint steroid injection fluoroscopic guidance      PREOPERATIVE Diagnosis:  Lumbosacral pain  SI joint dysfunction    POSTOPERATIVE DIAGNOSIS:   Same    DESCRIPTION OF PROCEDURE:  After consent was obtained the patient was escorted to the fluoroscopy suite and placed prone on the x-ray table.  The lumbosacral region was prepped with alcohol followed by Hibiclens.  The bilateral sacroiliac joint was identified under fluoroscopy.  The skin was anesthetized and a 22-gauge spinal needle was inserted and directed to the bilateral SI joint.  Once the needle was in position 40 mg mg Kenalog +20 mg 1% lidocaine was injected to the SI joint.  Patient tolerated the procedure without difficulty.          _______________________  Electronically Signed by:    Easton Lim DO , 04/21/25  [facility]     
Endocarditis    Sjogren's syndrome